# Patient Record
Sex: MALE | URBAN - METROPOLITAN AREA
[De-identification: names, ages, dates, MRNs, and addresses within clinical notes are randomized per-mention and may not be internally consistent; named-entity substitution may affect disease eponyms.]

---

## 2024-01-25 ENCOUNTER — OFFICE VISIT (OUTPATIENT)
Dept: URBAN - METROPOLITAN AREA CLINIC 107 | Facility: CLINIC | Age: 69
End: 2024-01-25
Payer: COMMERCIAL

## 2024-01-25 ENCOUNTER — LAB OUTSIDE AN ENCOUNTER (OUTPATIENT)
Dept: URBAN - METROPOLITAN AREA CLINIC 107 | Facility: CLINIC | Age: 69
End: 2024-01-25

## 2024-01-25 VITALS
BODY MASS INDEX: 29.73 KG/M2 | HEART RATE: 88 BPM | TEMPERATURE: 97.1 F | DIASTOLIC BLOOD PRESSURE: 91 MMHG | SYSTOLIC BLOOD PRESSURE: 141 MMHG | WEIGHT: 212.4 LBS | HEIGHT: 71 IN

## 2024-01-25 DIAGNOSIS — Z87.11 HISTORY OF GASTRIC ULCER: ICD-10-CM

## 2024-01-25 DIAGNOSIS — R19.4 BOWEL HABIT CHANGES: ICD-10-CM

## 2024-01-25 DIAGNOSIS — R10.13 EPIGASTRIC PAIN: ICD-10-CM

## 2024-01-25 DIAGNOSIS — K76.0 HEPATIC STEATOSIS: ICD-10-CM

## 2024-01-25 DIAGNOSIS — R17 TOTAL BILIRUBIN, ELEVATED: ICD-10-CM

## 2024-01-25 DIAGNOSIS — K76.9 HEPATIC LESION: ICD-10-CM

## 2024-01-25 DIAGNOSIS — K21.9 GASTROESOPHAGEAL REFLUX DISEASE, UNSPECIFIED WHETHER ESOPHAGITIS PRESENT: ICD-10-CM

## 2024-01-25 PROBLEM — 300331000: Status: ACTIVE | Noted: 2024-01-25

## 2024-01-25 PROBLEM — 197321007: Status: ACTIVE | Noted: 2024-01-25

## 2024-01-25 PROBLEM — 235595009: Status: ACTIVE | Noted: 2024-01-25

## 2024-01-25 PROCEDURE — 99244 OFF/OP CNSLTJ NEW/EST MOD 40: CPT

## 2024-01-25 RX ORDER — TESTOSTERONE CYPIONATE 200 MG/ML
INJECT 0.5 ML INTO THE MUSCLE EVERY 2 WEEKS AS DIRECTED INJECTION, SOLUTION INTRAMUSCULAR
Qty: 10 MILLILITER | Refills: 0 | Status: ACTIVE | COMMUNITY

## 2024-01-25 RX ORDER — TAMSULOSIN HYDROCHLORIDE 0.4 MG/1
TAKE 1 CAPSULE BY MOUTH ONCE DAILY 1/2 HOUR FOLLOWING THE SAME MEAL EACH DAY FOR PROTSTATE CAPSULE ORAL
Qty: 30 EACH | Refills: 2 | Status: ACTIVE | COMMUNITY

## 2024-01-25 RX ORDER — VALSARTAN 320 MG/1
TABLET ORAL
Qty: 30 TABLET | Status: ACTIVE | COMMUNITY

## 2024-01-25 RX ORDER — ROSUVASTATIN CALCIUM 5 MG/1
TABLET, FILM COATED ORAL
Qty: 30 TABLET | Status: ACTIVE | COMMUNITY

## 2024-01-25 RX ORDER — TADALAFIL 5 MG/1
1 TABLET AS NEEDED TABLET, FILM COATED ORAL ONCE A DAY
Qty: 30 | Status: ACTIVE | COMMUNITY
Start: 2024-01-25 | End: 2024-02-24

## 2024-01-25 NOTE — HPI-TODAY'S VISIT:
Mr. Dyer is a 68-year-old gentleman  who is a  and realtor with a history of fatty liver, GERD, hypertension, and hyperlipidemia, a fib s/p ablation (2021).  He was referred by Dr. Antony Rubio for evaluation of hepatic steatosis.  A copy of today's visit will be forwarded to referring provider.  He had complete abdominal ultrasound on 10/19/2023 that showed a liver with diffuse echogenic/fatty in appearance with numerous hepatic cyst; the largest hepatic cyst measuring 20 x 16 mm in size.  There is a slightly complicated proximally just left-sided renal cyst observed as well measuring 30 x 26 mm.  These findings can be followed up sonographically in 6 months.  Mild splenomegaly measuring 14 cm.  Please exclude liver disease/portal venous hypertension to account for this finding.  No CDs or focal fluid collection was otherwise seen.  No other concerning sonographic abnormality seen.  Most recent labs available for review from 4/24/2023 show elevated total bilirubin 1.62. He presents with complaints of epigastric pain. He is unable to characterize that pain but states that it waxes and wanes. He has increased flatus and heartburn. No difficulty swallowing.  He reports Friday he felt nauseous at dinner and then he began to regurgitate his food and then "bile." Then he noticed his bowels had changed to white in color but no change to consistency or frequency. Drinks alcohol on very rare occasions. He reports increase in stress with father passing 2 years ago and running 5 businesses. He has not had any unintentional weight loss. There is no jaundice or icterus. He had an EGD with Dr. Thomas 5/2023 that showed peptic ulcer and LA grade B reflux esophagitis. I do not have pathology report. This will be scanned into chart. But he states she started him on keflex, omeprazole and famotidine, but told him long term effect of PPI would cause osteoporosis so he would not continue it. He also had colonoscopy in 2022 with Dr. Thomas this will also be uploaded into chart.